# Patient Record
Sex: MALE | Race: WHITE | Employment: FULL TIME | URBAN - METROPOLITAN AREA
[De-identification: names, ages, dates, MRNs, and addresses within clinical notes are randomized per-mention and may not be internally consistent; named-entity substitution may affect disease eponyms.]

---

## 2024-02-21 ENCOUNTER — HOSPITAL ENCOUNTER (OUTPATIENT)
Age: 39
Discharge: HOME | End: 2024-02-21
Payer: COMMERCIAL

## 2024-02-21 VITALS
SYSTOLIC BLOOD PRESSURE: 124 MMHG | OXYGEN SATURATION: 98 % | HEART RATE: 56 BPM | DIASTOLIC BLOOD PRESSURE: 71 MMHG | RESPIRATION RATE: 18 BRPM | TEMPERATURE: 98 F

## 2024-02-21 DIAGNOSIS — R10.11 ABDOMINAL PAIN, RUQ (RIGHT UPPER QUADRANT): Primary | ICD-10-CM

## 2024-02-21 PROCEDURE — 99202 OFFICE O/P NEW SF 15 MIN: CPT

## 2024-02-21 PROCEDURE — 99203 OFFICE O/P NEW LOW 30 MIN: CPT

## 2024-02-21 NOTE — DISCHARGE INSTRUCTIONS
If you develop any new or worsening symptoms or right upper abdominal pain or general upper abdominal pain returns he gets chest pain or shortness of breath nausea vomiting diarrhea or any problems with constipation urinary symptoms or any new or worsening symptoms go to the nearest emergency department as discussed.  You may follow-up with your primary care provider in 2 days.  Continue to eat and drink regular avoid spicy greasy fried foods as the gallbladder does break down fatty foods.

## 2024-02-21 NOTE — ED PROVIDER NOTES
Patient Seen in: [facility]      History     Chief Complaint   Patient presents with    Abdominal Pain     Stated Complaint: abd pain    Subjective:   HPI    This is a 38-year-old male with history of IBS presenting for evaluation of abdominal pain.  Patient states that he was seen at another immediate care center this morning for right-sided chest pain and on his exam when they touched his right upper abdomen he did have pain.  Patient states they did COVID testing some other test and everything was negative and recommended that he come here to be evaluated and get an ultrasound as there could be an issue with his gallbladder.  Patient states he went home and drink chamomile tea which seemed to resolve his symptoms.  Patient states he is not having any abdominal pain now or chest pain no back pain no urinary symptoms no nausea or vomiting diarrhea or constipation.  Last bowel movement was yesterday and soft.  Denies any injury or trauma.  Denies any pain with eating foods patient states he does eat a variety of foods has been eating more meats.    Objective:   History reviewed. No pertinent past medical history.           History reviewed. No pertinent surgical history.             Social History     Socioeconomic History    Marital status:    Tobacco Use    Smoking status: Unknown   Vaping Use    Vaping Use: Every day   Substance and Sexual Activity    Alcohol use: Not Currently    Drug use: Not Currently              Review of Systems    Positive for stated complaint: abd pain  Other systems are as noted in HPI.  Constitutional and vital signs reviewed.      All other systems reviewed and negative except as noted above.    Physical Exam     ED Triage Vitals [02/21/24 1742]   /71   Pulse 56   Resp 18   Temp 97.9 °F (36.6 °C)   Temp src Temporal   SpO2 98 %   O2 Device None (Room air)       Current:/71   Pulse 56   Temp 97.9 °F (36.6 °C) (Temporal)   Resp 18   SpO2 98%          Physical Exam  Vitals and nursing note reviewed.   Constitutional:       Appearance: Normal appearance.   HENT:      Right Ear: External ear normal.      Left Ear: External ear normal.      Nose: Nose normal.      Mouth/Throat:      Mouth: Mucous membranes are moist.      Pharynx: Oropharynx is clear.   Eyes:      Conjunctiva/sclera: Conjunctivae normal.   Cardiovascular:      Rate and Rhythm: Normal rate.   Pulmonary:      Effort: Pulmonary effort is normal. No respiratory distress.      Breath sounds: Normal breath sounds.   Chest:      Chest wall: No tenderness.   Abdominal:      Palpations: Abdomen is soft.      Tenderness: There is no abdominal tenderness. There is no right CVA tenderness or left CVA tenderness.   Musculoskeletal:         General: Normal range of motion.      Cervical back: Normal range of motion.   Skin:     General: Skin is warm.      Capillary Refill: Capillary refill takes less than 2 seconds.   Neurological:      General: No focal deficit present.      Mental Status: He is alert and oriented to person, place, and time.               ED Course   Labs Reviewed - No data to display                   MDM          Medical Decision Making  38-year-old male well-appearing nontoxic no abdominal tenderness no CVA tenderness no complaint of abdominal pain at this time presenting for possible ultrasound of the gallbladder as he had tenderness on another exam at another facility this morning.  Concern for gastritis versus GERD versus cholelithiasis versus cholecystitis versus pancreatitis versus indigestion.  Discussed with the patient possible diagnosis.  Discussed given no abdominal tenderness at this time no clinical indication for labs or imaging but given he had the pain earlier today recommend transfer to the emergency department for further evaluation as unable to perform CMP lipase or ultrasound of the gallbladder.  Discussed risk factors of not going such as worsening symptoms  debilitating disease or death.  Patient states he understands the risk factors since he is not having the symptoms at this time he will not go to the emergency department but if the symptoms return or he feels something is changed or if he gets any new symptoms he will go to the nearest emergency department.  Encouraged him to follow-up with primary care provider reinforce strict ER precautions and dietary habits.  Patient acknowledged understanding instructions.    Amount and/or Complexity of Data Reviewed  Discussion of management or test interpretation with external provider(s): This patient was discussed with my attending Dr. Gaxiola who agrees with this provider's management and plan of care.    Risk  OTC drugs.        Disposition and Plan     Clinical Impression:  1. Abdominal pain, RUQ (right upper quadrant)         Disposition:  Discharge  2/21/2024  5:54 pm    Follow-up:  Nelida Rojas MD  [address]  [place] [address] [phone]      Resource for primary care doctor to follow up with if you do not have          Medications Prescribed:  There are no discharge medications for this patient.

## 2024-02-21 NOTE — ED INITIAL ASSESSMENT (HPI)
R sided chest /ruq abd pain this am, was seen at another immediate care and was sent here for US,pt denies uri symptoms,no n/v/d

## (undated) NOTE — LETTER
Date & Time: 2/21/2024, 5:54 PM  Patient: Cash Mora  Encounter Provider(s):    Jacque Cardozo APRN       To Whom It May Concern:    Cash Mora was seen and treated in our department on 2/21/2024. He should not return to work until 02/23/2024 .    If you have any questions or concerns, please do not hesitate to call.    KRISTA Fernandez    _____________________________  Physician/[facility] Signature